# Patient Record
Sex: FEMALE | Race: WHITE | NOT HISPANIC OR LATINO | URBAN - METROPOLITAN AREA
[De-identification: names, ages, dates, MRNs, and addresses within clinical notes are randomized per-mention and may not be internally consistent; named-entity substitution may affect disease eponyms.]

---

## 2017-07-09 ENCOUNTER — EMERGENCY (EMERGENCY)
Facility: HOSPITAL | Age: 22
LOS: 1 days | Discharge: HOME | End: 2017-07-09
Admitting: EMERGENCY MEDICINE
Payer: COMMERCIAL

## 2017-07-09 VITALS
RESPIRATION RATE: 18 BRPM | SYSTOLIC BLOOD PRESSURE: 112 MMHG | DIASTOLIC BLOOD PRESSURE: 78 MMHG | OXYGEN SATURATION: 98 % | TEMPERATURE: 98 F | HEART RATE: 63 BPM

## 2017-07-09 DIAGNOSIS — Z79.2 LONG TERM (CURRENT) USE OF ANTIBIOTICS: ICD-10-CM

## 2017-07-09 DIAGNOSIS — L03.114 CELLULITIS OF LEFT UPPER LIMB: ICD-10-CM

## 2017-07-09 DIAGNOSIS — M79.602 PAIN IN LEFT ARM: ICD-10-CM

## 2017-07-09 PROCEDURE — 99283 EMERGENCY DEPT VISIT LOW MDM: CPT | Mod: 25

## 2017-07-09 PROCEDURE — 99053 MED SERV 10PM-8AM 24 HR FAC: CPT

## 2017-07-09 RX ORDER — CEPHALEXIN 500 MG
500 CAPSULE ORAL ONCE
Qty: 0 | Refills: 0 | Status: COMPLETED | OUTPATIENT
Start: 2017-07-09 | End: 2017-07-09

## 2017-07-09 RX ORDER — IBUPROFEN 200 MG
600 TABLET ORAL ONCE
Qty: 0 | Refills: 0 | Status: COMPLETED | OUTPATIENT
Start: 2017-07-09 | End: 2017-07-09

## 2017-07-09 RX ORDER — CEPHALEXIN 500 MG
1 CAPSULE ORAL
Qty: 40 | Refills: 0 | OUTPATIENT
Start: 2017-07-09 | End: 2017-07-19

## 2017-07-09 RX ADMIN — Medication 600 MILLIGRAM(S): at 02:45

## 2017-07-09 RX ADMIN — Medication 500 MILLIGRAM(S): at 02:45

## 2017-07-09 NOTE — ED ADULT TRIAGE NOTE - CHIEF COMPLAINT QUOTE
Pt states she had a tetanus shot last Thursday and now has redness, swelling and its warm to the touch. [place] is leaving the [place] on [place] and wants it checked out

## 2017-07-09 NOTE — ED PROVIDER NOTE - PHYSICAL EXAMINATION
L arm: 4cm x 4cm area to LUE of erythema, warmth, tenderness  no fluctuance or crepitus   non-blanching

## 2017-07-09 NOTE — ED PROVIDER NOTE - OBJECTIVE STATEMENT
23 y/o F     no pmhx    presents with redness, warmth, and pain to L upper arm 5 days s/p tdap shot.

## 2021-04-20 NOTE — ED ADULT NURSE NOTE - CAS ELECT INFOMATION PROVIDED
lisinopril-hydrochlorothiazide (ZESTORETIC) 20-12.5 MG tablet 180 tablet 1 10/20/2020        DC instructions